# Patient Record
Sex: FEMALE | Race: BLACK OR AFRICAN AMERICAN | HISPANIC OR LATINO
[De-identification: names, ages, dates, MRNs, and addresses within clinical notes are randomized per-mention and may not be internally consistent; named-entity substitution may affect disease eponyms.]

---

## 2017-03-30 ENCOUNTER — APPOINTMENT (OUTPATIENT)
Dept: VASCULAR SURGERY | Facility: CLINIC | Age: 52
End: 2017-03-30

## 2017-03-30 VITALS — HEART RATE: 66 BPM | SYSTOLIC BLOOD PRESSURE: 130 MMHG | RESPIRATION RATE: 16 BRPM | DIASTOLIC BLOOD PRESSURE: 90 MMHG

## 2017-03-30 VITALS — WEIGHT: 195 LBS | BODY MASS INDEX: 31.34 KG/M2 | TEMPERATURE: 98.2 F | HEIGHT: 66 IN

## 2017-03-30 DIAGNOSIS — M25.471 EFFUSION, RIGHT ANKLE: ICD-10-CM

## 2017-03-30 DIAGNOSIS — Z87.59 PERSONAL HISTORY OF OTHER COMPLICATIONS OF PREGNANCY, CHILDBIRTH AND THE PUERPERIUM: ICD-10-CM

## 2017-03-30 DIAGNOSIS — Z87.19 PERSONAL HISTORY OF OTHER DISEASES OF THE DIGESTIVE SYSTEM: ICD-10-CM

## 2017-03-30 DIAGNOSIS — Z86.718 PERSONAL HISTORY OF OTHER VENOUS THROMBOSIS AND EMBOLISM: ICD-10-CM

## 2017-03-30 DIAGNOSIS — F15.90 OTHER STIMULANT USE, UNSPECIFIED, UNCOMPLICATED: ICD-10-CM

## 2017-03-30 DIAGNOSIS — M25.472 EFFUSION, RIGHT ANKLE: ICD-10-CM

## 2017-03-30 PROBLEM — Z00.00 ENCOUNTER FOR PREVENTIVE HEALTH EXAMINATION: Status: ACTIVE | Noted: 2017-03-30

## 2017-03-31 ENCOUNTER — FORM ENCOUNTER (OUTPATIENT)
Age: 52
End: 2017-03-31

## 2017-04-01 ENCOUNTER — OUTPATIENT (OUTPATIENT)
Dept: OUTPATIENT SERVICES | Facility: HOSPITAL | Age: 52
LOS: 1 days | End: 2017-04-01
Payer: COMMERCIAL

## 2017-04-01 ENCOUNTER — APPOINTMENT (OUTPATIENT)
Dept: CT IMAGING | Facility: IMAGING CENTER | Age: 52
End: 2017-04-01

## 2017-04-01 DIAGNOSIS — Z86.718 PERSONAL HISTORY OF OTHER VENOUS THROMBOSIS AND EMBOLISM: ICD-10-CM

## 2017-04-01 PROCEDURE — 74176 CT ABD & PELVIS W/O CONTRAST: CPT

## 2017-06-29 ENCOUNTER — APPOINTMENT (OUTPATIENT)
Dept: VASCULAR SURGERY | Facility: CLINIC | Age: 52
End: 2017-06-29

## 2018-02-13 ENCOUNTER — APPOINTMENT (OUTPATIENT)
Dept: VASCULAR SURGERY | Facility: CLINIC | Age: 53
End: 2018-02-13
Payer: COMMERCIAL

## 2018-02-13 VITALS — RESPIRATION RATE: 17 BRPM | BODY MASS INDEX: 31.34 KG/M2 | TEMPERATURE: 98.7 F | WEIGHT: 195 LBS | HEIGHT: 66 IN

## 2018-02-13 VITALS — DIASTOLIC BLOOD PRESSURE: 72 MMHG | SYSTOLIC BLOOD PRESSURE: 114 MMHG | HEART RATE: 62 BPM

## 2018-02-13 PROCEDURE — 93971 EXTREMITY STUDY: CPT

## 2018-02-13 PROCEDURE — 99213 OFFICE O/P EST LOW 20 MIN: CPT

## 2023-01-11 ENCOUNTER — APPOINTMENT (OUTPATIENT)
Dept: ORTHOPEDIC SURGERY | Facility: CLINIC | Age: 58
End: 2023-01-11
Payer: COMMERCIAL

## 2023-01-11 VITALS — WEIGHT: 180 LBS | BODY MASS INDEX: 28.93 KG/M2 | HEIGHT: 66 IN

## 2023-01-11 PROCEDURE — 72170 X-RAY EXAM OF PELVIS: CPT

## 2023-01-11 PROCEDURE — 72110 X-RAY EXAM L-2 SPINE 4/>VWS: CPT

## 2023-01-11 PROCEDURE — 99214 OFFICE O/P EST MOD 30 MIN: CPT

## 2023-01-11 RX ORDER — METHYLPREDNISOLONE 4 MG/1
4 TABLET ORAL
Qty: 1 | Refills: 0 | Status: ACTIVE | COMMUNITY
Start: 2023-01-11 | End: 1900-01-01

## 2023-01-11 RX ORDER — CYCLOBENZAPRINE HYDROCHLORIDE 5 MG/1
5 TABLET, FILM COATED ORAL
Qty: 90 | Refills: 0 | Status: ACTIVE | COMMUNITY
Start: 2023-01-11 | End: 1900-01-01

## 2023-01-11 NOTE — HISTORY OF PRESENT ILLNESS
[Gradual] : gradual [5] : 5 [Burning] : burning [Radiating] : radiating [Sleep] : sleep [Heat] : heat [Standing] : standing [Lying in bed] : lying in bed [de-identified] : 1/11/23- 58 y/o F presents for lower back pain X several years, worsening in the past 3 months. Associated radiation down LLE posteriorly above the knee, w/o N/T. Denies specific injury. Aggravated by bending forward and prolonged sitting/standing/lying down. Pain interferes with sleep. Has tried hot compresses and Tylenol, with partial relief. Denies prior back surgeries or physical therapy. Denies b/b dysfunction. \par \par PMH: history of DVT 2017 (on Warfarin)  [] : no [FreeTextEntry1] : Lower back [FreeTextEntry3] : 10/2022 [FreeTextEntry5] : pt has been experiencing back pain for many years and she recently had a flair up and pain radiates down her left leg [FreeTextEntry6] : discomfort [FreeTextEntry7] : left leg [de-identified] : m

## 2023-01-11 NOTE — ASSESSMENT
[FreeTextEntry1] : 57 F with axial LBP and LLE radiculopathy intermittently.  Worse with sitting for extended periods of time.  No recent PT. UNable to take NSAIDS due to warfarin\par PT\par MDP\par Muscle relaxants PRN \par FU 6 weeks if pain persists MRI L spine

## 2023-01-11 NOTE — IMAGING
[Straightening consistent with spasm] : Straightening consistent with spasm [Facet arthropathy] : Facet arthropathy [Disc space narrowing] : Disc space narrowing [AP] : anteroposterior [There are no fractures, subluxations or dislocations. No significant abnormalities are seen] : There are no fractures, subluxations or dislocations. No significant abnormalities are seen [de-identified] : L spine\par \par Palpation: No midline lumbar tenderness. No tenderness to palpation or spasm in bilateral thoracic and lumbar paraspinal musculature. No SI joint tenderness to palpation. \par \par ROM: limited forward flexion due to pain\par \par Strength: 5/5 bilateral hip flexors, knee extensors, ankle dorsiflexors, EHL, ankle plantarflexors \par \par Sensation: Sensation present to light touch bilateral L2-S1 distributions \par \par Provocative maneuvers: equivocal bilateral straight leg raise\par \par Bilateral hips- \par \par Palpation: No tenderness to palpation over greater trochanter or IT band \par \par ROM: No groin pain with flexion and internal rotation\par \par  [FreeTextEntry1] : L4-5/L5-S1 DDD

## 2023-01-13 ENCOUNTER — APPOINTMENT (OUTPATIENT)
Dept: ORTHOPEDIC SURGERY | Facility: CLINIC | Age: 58
End: 2023-01-13

## 2023-02-17 ENCOUNTER — APPOINTMENT (OUTPATIENT)
Dept: ORTHOPEDIC SURGERY | Facility: CLINIC | Age: 58
End: 2023-02-17
Payer: COMMERCIAL

## 2023-02-17 VITALS — HEIGHT: 66 IN | BODY MASS INDEX: 28.93 KG/M2 | WEIGHT: 180 LBS

## 2023-02-17 DIAGNOSIS — M51.9 UNSPECIFIED THORACIC, THORACOLUMBAR AND LUMBOSACRAL INTERVERTEBRAL DISC DISORDER: ICD-10-CM

## 2023-02-17 DIAGNOSIS — M62.830 MUSCLE SPASM OF BACK: ICD-10-CM

## 2023-02-17 PROCEDURE — 99213 OFFICE O/P EST LOW 20 MIN: CPT

## 2023-02-17 NOTE — IMAGING
[Straightening consistent with spasm] : Straightening consistent with spasm [Facet arthropathy] : Facet arthropathy [Disc space narrowing] : Disc space narrowing [AP] : anteroposterior [There are no fractures, subluxations or dislocations. No significant abnormalities are seen] : There are no fractures, subluxations or dislocations. No significant abnormalities are seen [de-identified] : L spine\par \par Palpation: No midline lumbar tenderness. No tenderness to palpation or spasm in bilateral thoracic and lumbar paraspinal musculature. No SI joint tenderness to palpation. \par \par ROM: limited forward flexion due to pain\par \par Strength: 5/5 bilateral hip flexors, knee extensors, ankle dorsiflexors, EHL, ankle plantarflexors \par \par Sensation: Sensation present to light touch bilateral L2-S1 distributions \par \par Provocative maneuvers: equivocal bilateral straight leg raise\par \par Bilateral hips- \par \par Palpation: No tenderness to palpation over greater trochanter or IT band \par \par ROM: No groin pain with flexion and internal rotation\par \par  [FreeTextEntry1] : L4-5/L5-S1 DDD

## 2023-02-17 NOTE — ASSESSMENT
[FreeTextEntry1] : 57 F with axial LBP and LLE radiculopathy intermittently.  Worse with sitting for extended periods of time.  No recent PT. Some relief with MDP.  Unable to take NSAIDS due to warfarin.  Pain is 80% improved.  Worse in Lower back. \par \par PT\par Refer medical massage\par \par

## 2023-02-17 NOTE — HISTORY OF PRESENT ILLNESS
[Gradual] : gradual [2] : 2 [1] : 2 [Burning] : burning [Localized] : localized [Radiating] : radiating Declined [Intermittent] : intermittent [Sleep] : sleep [Heat] : heat [Standing] : standing [Lying in bed] : lying in bed [de-identified] : 2/17/23:  2-3 days after taking MDP she noticed some improvement.  Muscle relaxers are helpful.  Pt reports 80% improvement.  Some discomfort with sleeping.  Pt did not start PT.  Pt obtains relief from medical massage.\par \par 1/11/23- 56 y/o F presents for lower back pain X several years, worsening in the past 3 months. Associated radiation down LLE posteriorly above the knee, w/o N/T. Denies specific injury. Aggravated by bending forward and prolonged sitting/standing/lying down. Pain interferes with sleep. Has tried hot compresses and Tylenol, with partial relief. Denies prior back surgeries or physical therapy. Denies b/b dysfunction. \par \par PMH: history of DVT 2017 (on Warfarin) .\par \par 02/17/23 pt presents here today for a follow up lower spine,pt did not do physical therapy ,took mdp and start felling better  [] : no [FreeTextEntry1] : Lower back [FreeTextEntry3] : 10/2022 [FreeTextEntry5] : pt has been experiencing back pain for many years and she recently had a flair up and pain radiates down her left leg [FreeTextEntry6] : discomfort [FreeTextEntry7] : left leg [de-identified] : x rays done at [facility]  [de-identified] : mdp

## 2024-10-04 ENCOUNTER — NON-APPOINTMENT (OUTPATIENT)
Age: 59
End: 2024-10-04

## 2025-05-03 ENCOUNTER — NON-APPOINTMENT (OUTPATIENT)
Age: 60
End: 2025-05-03